# Patient Record
Sex: FEMALE | Race: WHITE | NOT HISPANIC OR LATINO | Employment: OTHER | URBAN - METROPOLITAN AREA
[De-identification: names, ages, dates, MRNs, and addresses within clinical notes are randomized per-mention and may not be internally consistent; named-entity substitution may affect disease eponyms.]

---

## 2024-01-05 ENCOUNTER — TELEPHONE (OUTPATIENT)
Dept: SURGICAL ONCOLOGY | Facility: HOSPITAL | Age: 79
End: 2024-01-05
Payer: MEDICARE

## 2024-01-05 NOTE — TELEPHONE ENCOUNTER
Patient would like to schedule a virtual visit for a second opinion with Dr. Castellano; patient can be reached at [phone]

## 2024-01-08 PROBLEM — C57.00: Status: ACTIVE | Noted: 2024-01-08

## 2024-01-08 NOTE — PROGRESS NOTES
Patient ID: Anastasia Ferrell is a 78 y.o. female.  Referring Physician: No referring provider defined for this encounter.  Primary Care Provider: No primary care provider on file.    Subjective    Interval History:    Anastasia's overall doing well she just had questions about her upcoming surgery and whetheher or not I thought it would be beneficial for her to receive HIPEC at the time of surgery in the setting of a secondry debulking potetnitally with someeone that is platinum resitant vs borderline platinum sensitive, we discussed that I would not personally recommend that especially given her recent MI overall she is otherwise feeling well no concerns or complaints.      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  States her family is  worried about prions and therefore now has their own dental equipment.      Past Surgical History: tonsillectomy, appendectomy (XL for ruptured appy, 1962, had a drain in for 2 weeks after)     Family History:    Dad - colon ca @74.   Mom- hereditary Creutzfeld-Espinoza Disease.  Brother - MI   Otherwise denies a history of gyn related cancers including ovarian, endometrial, breast, pancreas, and GI cancers.      Social History: Denies a history of smoking, alcohol use or recreational drug use.    She states she was wrongfully convicted and imprisoned there for 3 years.  States a [place] heroin dealer named her and she had never even met him.      Prior to that she was living in [place].  Both of her children still live in [place] and now she is living with her son.  She notes she is on house arrest and only able to leave for doctors appointments.  She states if she needs an MRI or any other imaging/procedure  where her leg monitor would need to be removed that she needs to contact her  first.      Son and Daughter. 4 grandkids.  Lives with son. He is helping with her wrongful imprisonment case.          OBGYN History:  P2.  x2.  no h/o abnl paps     Screening:  -Mammogram: Dec 2018, followed by US which had benign findings  -Colonoscopy: reports normal ~         Objective    BSA: There is no height or weight on file to calculate BSA.  There were no vitals taken for this visit.     Physical Exam    CT ABD/PEL WO IVCON  Order: [id number]  Impression    IMPRESSION: No judi lymphadenopathy is seen within the chest.    Tree-in-bud type pulmonary nodules are seen predominantly left lower  lobe, likely infectious or inflammatory in etiology.  Subcentimeter  pulmonary nodules are seen elsewhere within the lungs, stable.  Sequela  of remote granulomatous disease.    Interval increase in size of a right anterior pelvic implant as well as  mass at the base of the cecum, when compared to the prior examination,  concerning for progressing disease.    Again seen are remote bilateral sacral insufficiency fractures.          : JESSY    Transcribe Date/Time: Dec  6 2023  7:58A    Dictated by : JESSICA BERGMAN MD    This examination was interpreted and the report reviewed and  electronically signed by:  JESSICA BERGMAN MD on Dec  6 2023  8:13AM  EST  Narrative    * * *Final Report* * *    DATE OF EXAM: Dec  5 2023  4:28PM      [facility]   0531  -  CT ABD/PEL WO IVCON  / ACCESSION #  [id number]    PROCEDURE REASON: multiple diagnoses        * * * * Physician Interpretation * * * *     EXAMINATION:  CT CHEST WITHOUT CONTRAST AND CT ABDOMEN AND PELVIS  WITHOUT IV CONTRAST    CLINICAL HISTORY: Ovarian cancer, bilateral (HCC)    TECHNIQUE: CT of the chest from the thoracic inlet to the upper abdomen  was performed without contrast.  CT of the abdomen and pelvis was  performed using standard technique.    Contrast:  Oral:  400 ml of Omni 350 10-25ml diluted with water    CT Radiation dose: Integrated Dose-length product (DLP) for this visit =    715 mGy*cm.  CT Dose Reduction Employed: Automated exposure control(AEC) and  iterative  recon    COMPARISON: CT abdomen and pelvis dated 09/02/2023.  PET-CT examination  dated 06/08/2023..      RESULT:    Limitations:  None.      Chest:    Lines, tubes, and devices:  None.    Lung parenchyma and pleura: There is no pleural effusion.  Calcified  granulomas are seen within both lungs.  Minimal biapical fibrosis.  There  is no pneumothorax.  Mild, diffuse bronchiectasis.  There is dependent  atelectasis.  There are tree-in-bud type nodular opacities seen  predominantly within the left lower lobe, likely infectious or  inflammatory in etiology; however, continued interval surveillance is  recommended.  There are stable subcentimeter pulmonary nodules seen  elsewhere within the lungs.  For example, there is a stable,  approximately 6 mm subpleural nodule seen within the right apex (series  3, image #23).  There is no pneumothorax or endobronchial lesion.    Thoracic inlet, heart, and mediastinum:  Again seen is calcification  within the left lobe of thyroid gland.  Hypodense nodules are again seen  within each lobe of thyroid gland.  There are no pathologically enlarged  axillary, mediastinal, or hilar lymph nodes.  Atherosclerotic  calcifications are present within the coronary arteries.  There is a  stent seen within the LAD.  The heart is normal in size.  There is no  significant pericardial effusion.    Abdomen/pelvis:    Liver: Within the limits of this noncontrast enhanced examination, there  is no obvious focal discrete hepatic mass.    Biliary: There is cholelithiasis within a relatively collapsed  gallbladder.  There is no biliary dilation.    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    Pancreas: Within the limits of this noncontrast enhanced examination,  there is no obvious focal discrete pancreatic mass or pancreatic ductal  dilation.    Adrenals:No mass.    Kidneys: There is no hydronephrosis or perinephric fluid collection.    Again seen is an approximately 1.3 cm cyst within the interpolar  segment  of the left kidney (series 3, image #54).    GI tract: There are no dilated loops of bowel to suggest obstruction.    Moderate to large stool burden.  Again seen is a mass along the base of  the cecum, which has increased in size in the interval, currently  measuring approximately 2.3 x 1.6 cm, previously 1.7 x 1.4 cm (series 3,  image #91).    Lymph nodes: There is no abdominal lymphadenopathy.    Mesentery/Peritoneum: Right anterior pelvic implant has increased in size  in the interval, currently measuring approximately 2.3 x 1.6 cm,  previously 1.9 x 1.4 cm (series 3, image #88).  There is no abdominal  ascites.    Vasculature:  No abdominal aortic aneurysm.  Atherosclerotic  calcifications are present within the abdominal aorta.    Pelvis: The patient is status post hysterectomy.  Phleboliths are  incidentally noted within the pelvis.  Prominent, less than 1 cm pelvic  lymph nodes are likely reactive.    Bones/Soft Tissues: There is bilateral hip DJD.  A few presumed bone  islands are incidentally noted within the osseous structures.  Remote  bilateral sacral insufficiency fractures are again seen.  There is  multilevel degenerative change seen within the visualized spine, with  vacuum disc phenomena seen at multiple levels within the lumbar spine.    There is bilateral shoulder DJD.  There is multilevel degenerative change  seen within the thoracic spine.    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  - Started NACT with carboplatin and paclitaxel 7/2020 August 2020: No deleterious mutations on INVITE genetic panel of 47 genes that are associated with genetic disorders.  VUS of ADRIÁN gene.  - 11/30/2020 Exploratory laparotomy, total abdominal hysterectomy, bilateral salpingo-oophorectomy, repair of cystotomy, tumor reductive surgery,  omentectomy, splenectomy with en bloc distal pancreatectomy,  partial gastrectomy, repair of enterotomy, mobilization of the splenic flexure.  - 1/7/21: Consent for Niraparib signed - : 451  - 9/21 low volume recurrence, started on carbo/doxil for 17 cycles (last 12/2022)  - 05/31/2022: [facility] somatic testing from spleen lesion 11/2020, BRCA2 deleterious mutation, PD-L1 expression positive, ER positive, IMER low    - [facility] recommended secondary debulking +/- HIPEC. She is s/p recent MI in 2023.               Fallopian tube carcinoma (CMS/HCC)   1/8/2024 Initial Diagnosis    Fallopian tube carcinoma (CMS/HCC)     78 y.o.  with platinum-resistant HGSOC currently receiving care at [facility] presenting for second opinion  PS: 0      # Ovarian cancer  - We reviewed her recent treatments, imaging, tumor markers  - We discussed her current imaging, and plan for secondary debulking with Dr. Amezquita  - I agree with recommendation for a secondary cytoreduction with Dr. Amezquita I also agree that HIPEC would not be warranted nor indicated in this situation.  - We discussed possible need for systemic chemotherapy after surgery and we briefly discussed forms of systemic chemotherpay or targeted therapies and immunotherapy that she might be a candidate for in the future.    I spent 57 minutes virtually or in a telephone with this patient and/or family. More than 50% of the time was spent in counseling and/or coordination of care.      Scribe Attestation  By signing my name below, I, Galdino Teresa   attest that this documentation has been prepared under the direction and in the presence of Zuleyma Upton MD.     Provider Attestation - Scribe documentation    All medical record entries made by the Scribe were at my direction and personally dictated by me. I have reviewed the chart and agree that the record accurately reflects my personal performance of the history, physical exam, discussion and plan.    Zuleyma PACE  MD Won

## 2024-01-09 ENCOUNTER — TELEMEDICINE (OUTPATIENT)
Dept: GYNECOLOGIC ONCOLOGY | Facility: CLINIC | Age: 79
End: 2024-01-09
Payer: MEDICARE

## 2024-01-09 DIAGNOSIS — C57.00 CARCINOMA OF FALLOPIAN TUBE, UNSPECIFIED LATERALITY (MULTI): Primary | ICD-10-CM

## 2024-01-09 PROCEDURE — 99215 OFFICE O/P EST HI 40 MIN: CPT | Performed by: STUDENT IN AN ORGANIZED HEALTH CARE EDUCATION/TRAINING PROGRAM
